# Patient Record
Sex: FEMALE | Race: WHITE | Employment: OTHER | URBAN - METROPOLITAN AREA
[De-identification: names, ages, dates, MRNs, and addresses within clinical notes are randomized per-mention and may not be internally consistent; named-entity substitution may affect disease eponyms.]

---

## 2018-10-28 ENCOUNTER — APPOINTMENT (OUTPATIENT)
Dept: GENERAL RADIOLOGY | Age: 83
DRG: 535 | End: 2018-10-28
Payer: MEDICARE

## 2018-10-28 ENCOUNTER — HOSPITAL ENCOUNTER (INPATIENT)
Age: 83
LOS: 2 days | Discharge: NURSING, REHAB OR LONG-TERM CARE | DRG: 535 | End: 2018-10-30
Attending: EMERGENCY MEDICINE | Admitting: INTERNAL MEDICINE
Payer: MEDICARE

## 2018-10-28 ENCOUNTER — APPOINTMENT (OUTPATIENT)
Dept: CT IMAGING | Age: 83
DRG: 535 | End: 2018-10-28
Payer: MEDICARE

## 2018-10-28 DIAGNOSIS — S72.001A CLOSED FRACTURE OF RIGHT HIP, INITIAL ENCOUNTER (HCC): Primary | ICD-10-CM

## 2018-10-28 PROBLEM — I10 ESSENTIAL HYPERTENSION: Chronic | Status: ACTIVE | Noted: 2018-10-28

## 2018-10-28 PROBLEM — Z86.79 H/O CHF: Status: ACTIVE | Noted: 2018-10-28

## 2018-10-28 PROBLEM — R09.02 HYPOXIA: Status: ACTIVE | Noted: 2018-10-28

## 2018-10-28 PROBLEM — M25.551 ACUTE HIP PAIN, RIGHT: Status: ACTIVE | Noted: 2018-10-28

## 2018-10-28 PROBLEM — N18.9 CKD (CHRONIC KIDNEY DISEASE): Chronic | Status: ACTIVE | Noted: 2018-10-28

## 2018-10-28 PROBLEM — E03.9 HYPOTHYROIDISM: Chronic | Status: ACTIVE | Noted: 2018-10-28

## 2018-10-28 PROBLEM — R32 URINARY INCONTINENCE: Chronic | Status: ACTIVE | Noted: 2018-10-28

## 2018-10-28 PROBLEM — Z86.79 H/O CARDIAC ARRHYTHMIA: Chronic | Status: ACTIVE | Noted: 2018-10-28

## 2018-10-28 PROBLEM — R06.02 SHORTNESS OF BREATH: Status: ACTIVE | Noted: 2018-10-28

## 2018-10-28 PROBLEM — K21.9 GASTROESOPHAGEAL REFLUX DISEASE WITHOUT ESOPHAGITIS: Chronic | Status: ACTIVE | Noted: 2018-10-28
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
## 2018-10-28 LAB
ANION GAP SERPL CALCULATED.3IONS-SCNC: 11 MMOL/L (ref 7–16)
APTT: 39.8 SEC (ref 24.5–35.1)
BACTERIA: ABNORMAL /HPF
BASOPHILS ABSOLUTE: 0.02 E9/L (ref 0–0.2)
BASOPHILS RELATIVE PERCENT: 0.2 % (ref 0–2)
BILIRUBIN URINE: NEGATIVE
BLOOD, URINE: ABNORMAL
BUN BLDV-MCNC: 23 MG/DL (ref 8–23)
CALCIUM SERPL-MCNC: 8.8 MG/DL (ref 8.6–10.2)
CHLORIDE BLD-SCNC: 102 MMOL/L (ref 98–107)
CLARITY: CLEAR
CO2: 28 MMOL/L (ref 22–29)
COLOR: ABNORMAL
CREAT SERPL-MCNC: 1.2 MG/DL (ref 0.5–1)
EOSINOPHILS ABSOLUTE: 0.04 E9/L (ref 0.05–0.5)
EOSINOPHILS RELATIVE PERCENT: 0.4 % (ref 0–6)
GFR AFRICAN AMERICAN: 50
GFR NON-AFRICAN AMERICAN: 42 ML/MIN/1.73
GLUCOSE BLD-MCNC: 107 MG/DL (ref 74–109)
GLUCOSE URINE: NEGATIVE MG/DL
HCT VFR BLD CALC: 38.8 % (ref 34–48)
HEMOGLOBIN: 12.5 G/DL (ref 11.5–15.5)
IMMATURE GRANULOCYTES #: 0.05 E9/L
IMMATURE GRANULOCYTES %: 0.5 % (ref 0–5)
INR BLD: 1.3
KETONES, URINE: NEGATIVE MG/DL
LEUKOCYTE ESTERASE, URINE: ABNORMAL
LYMPHOCYTES ABSOLUTE: 0.82 E9/L (ref 1.5–4)
LYMPHOCYTES RELATIVE PERCENT: 7.9 % (ref 20–42)
MCH RBC QN AUTO: 29.6 PG (ref 26–35)
MCHC RBC AUTO-ENTMCNC: 32.2 % (ref 32–34.5)
MCV RBC AUTO: 91.9 FL (ref 80–99.9)
MONOCYTES ABSOLUTE: 0.52 E9/L (ref 0.1–0.95)
MONOCYTES RELATIVE PERCENT: 5 % (ref 2–12)
NEUTROPHILS ABSOLUTE: 8.94 E9/L (ref 1.8–7.3)
NEUTROPHILS RELATIVE PERCENT: 86 % (ref 43–80)
NITRITE, URINE: NEGATIVE
PDW BLD-RTO: 14.3 FL (ref 11.5–15)
PH UA: 6 (ref 5–9)
PLATELET # BLD: 203 E9/L (ref 130–450)
PMV BLD AUTO: 10 FL (ref 7–12)
POTASSIUM SERPL-SCNC: 4 MMOL/L (ref 3.5–5)
PROTEIN UA: NEGATIVE MG/DL
PROTHROMBIN TIME: 15.1 SEC (ref 9.3–12.4)
RBC # BLD: 4.22 E12/L (ref 3.5–5.5)
RBC UA: ABNORMAL /HPF (ref 0–2)
SODIUM BLD-SCNC: 141 MMOL/L (ref 132–146)
SPECIFIC GRAVITY UA: <=1.005 (ref 1–1.03)
UROBILINOGEN, URINE: 0.2 E.U./DL
WBC # BLD: 10.4 E9/L (ref 4.5–11.5)
WBC UA: ABNORMAL /HPF (ref 0–5)

## 2018-10-28 PROCEDURE — 1200000000 HC SEMI PRIVATE

## 2018-10-28 PROCEDURE — 36415 COLL VENOUS BLD VENIPUNCTURE: CPT

## 2018-10-28 PROCEDURE — 51702 INSERT TEMP BLADDER CATH: CPT

## 2018-10-28 PROCEDURE — 73502 X-RAY EXAM HIP UNI 2-3 VIEWS: CPT

## 2018-10-28 PROCEDURE — 2700000000 HC OXYGEN THERAPY PER DAY

## 2018-10-28 PROCEDURE — 73700 CT LOWER EXTREMITY W/O DYE: CPT

## 2018-10-28 PROCEDURE — 70450 CT HEAD/BRAIN W/O DYE: CPT

## 2018-10-28 PROCEDURE — 2580000003 HC RX 258: Performed by: INTERNAL MEDICINE

## 2018-10-28 PROCEDURE — 80048 BASIC METABOLIC PNL TOTAL CA: CPT

## 2018-10-28 PROCEDURE — 99285 EMERGENCY DEPT VISIT HI MDM: CPT

## 2018-10-28 PROCEDURE — 6370000000 HC RX 637 (ALT 250 FOR IP): Performed by: INTERNAL MEDICINE

## 2018-10-28 PROCEDURE — 6370000000 HC RX 637 (ALT 250 FOR IP): Performed by: EMERGENCY MEDICINE

## 2018-10-28 PROCEDURE — 81001 URINALYSIS AUTO W/SCOPE: CPT

## 2018-10-28 PROCEDURE — 85610 PROTHROMBIN TIME: CPT

## 2018-10-28 PROCEDURE — 85730 THROMBOPLASTIN TIME PARTIAL: CPT

## 2018-10-28 PROCEDURE — 85025 COMPLETE CBC W/AUTO DIFF WBC: CPT

## 2018-10-28 PROCEDURE — 99223 1ST HOSP IP/OBS HIGH 75: CPT | Performed by: INTERNAL MEDICINE

## 2018-10-28 PROCEDURE — 73562 X-RAY EXAM OF KNEE 3: CPT

## 2018-10-28 PROCEDURE — 71045 X-RAY EXAM CHEST 1 VIEW: CPT

## 2018-10-28 RX ORDER — ONDANSETRON 2 MG/ML
4 INJECTION INTRAMUSCULAR; INTRAVENOUS EVERY 6 HOURS PRN
Status: DISCONTINUED | OUTPATIENT
Start: 2018-10-28 | End: 2018-10-30 | Stop reason: HOSPADM

## 2018-10-28 RX ORDER — LEVOTHYROXINE SODIUM 0.03 MG/1
25 TABLET ORAL DAILY
COMMUNITY

## 2018-10-28 RX ORDER — TRAMADOL HYDROCHLORIDE 50 MG/1
50 TABLET ORAL ONCE
Status: COMPLETED | OUTPATIENT
Start: 2018-10-28 | End: 2018-10-28

## 2018-10-28 RX ORDER — TRAMADOL HYDROCHLORIDE 50 MG/1
50 TABLET ORAL EVERY 6 HOURS PRN
Status: DISCONTINUED | OUTPATIENT
Start: 2018-10-28 | End: 2018-10-30 | Stop reason: HOSPADM

## 2018-10-28 RX ORDER — DOCUSATE SODIUM 100 MG/1
100 CAPSULE, LIQUID FILLED ORAL DAILY
COMMUNITY

## 2018-10-28 RX ORDER — TRAMADOL HYDROCHLORIDE 50 MG/1
50 TABLET ORAL EVERY 6 HOURS PRN
COMMUNITY

## 2018-10-28 RX ORDER — PANTOPRAZOLE SODIUM 40 MG/1
40 TABLET, DELAYED RELEASE ORAL
Status: DISCONTINUED | OUTPATIENT
Start: 2018-10-29 | End: 2018-10-30 | Stop reason: HOSPADM

## 2018-10-28 RX ORDER — AMLODIPINE BESYLATE 5 MG/1
5 TABLET ORAL DAILY
Status: DISCONTINUED | OUTPATIENT
Start: 2018-10-29 | End: 2018-10-30 | Stop reason: HOSPADM

## 2018-10-28 RX ORDER — METOPROLOL SUCCINATE 25 MG/1
25 TABLET, EXTENDED RELEASE ORAL 2 TIMES DAILY
COMMUNITY

## 2018-10-28 RX ORDER — DABIGATRAN ETEXILATE 75 MG/1
75 CAPSULE, COATED PELLETS ORAL 2 TIMES DAILY
COMMUNITY

## 2018-10-28 RX ORDER — LOSARTAN POTASSIUM 100 MG/1
100 TABLET ORAL DAILY
COMMUNITY

## 2018-10-28 RX ORDER — TROSPIUM CHLORIDE 20 MG/1
20 TABLET, FILM COATED ORAL NIGHTLY
Status: DISCONTINUED | OUTPATIENT
Start: 2018-10-28 | End: 2018-10-30 | Stop reason: HOSPADM

## 2018-10-28 RX ORDER — LEVOTHYROXINE SODIUM 0.03 MG/1
25 TABLET ORAL DAILY
Status: DISCONTINUED | OUTPATIENT
Start: 2018-10-29 | End: 2018-10-30 | Stop reason: HOSPADM

## 2018-10-28 RX ORDER — SODIUM CHLORIDE 0.9 % (FLUSH) 0.9 %
10 SYRINGE (ML) INJECTION PRN
Status: DISCONTINUED | OUTPATIENT
Start: 2018-10-28 | End: 2018-10-30 | Stop reason: HOSPADM

## 2018-10-28 RX ORDER — ACETAMINOPHEN 325 MG/1
650 TABLET ORAL EVERY 4 HOURS PRN
Status: DISCONTINUED | OUTPATIENT
Start: 2018-10-28 | End: 2018-10-30 | Stop reason: HOSPADM

## 2018-10-28 RX ORDER — DOCUSATE SODIUM 100 MG/1
100 CAPSULE, LIQUID FILLED ORAL DAILY
Status: DISCONTINUED | OUTPATIENT
Start: 2018-10-29 | End: 2018-10-30 | Stop reason: HOSPADM

## 2018-10-28 RX ORDER — LOSARTAN POTASSIUM 50 MG/1
100 TABLET ORAL DAILY
Status: DISCONTINUED | OUTPATIENT
Start: 2018-10-29 | End: 2018-10-30 | Stop reason: HOSPADM

## 2018-10-28 RX ORDER — FUROSEMIDE 20 MG/1
20 TABLET ORAL DAILY
Status: DISCONTINUED | OUTPATIENT
Start: 2018-10-29 | End: 2018-10-30 | Stop reason: HOSPADM

## 2018-10-28 RX ORDER — SODIUM CHLORIDE 0.9 % (FLUSH) 0.9 %
10 SYRINGE (ML) INJECTION EVERY 12 HOURS SCHEDULED
Status: DISCONTINUED | OUTPATIENT
Start: 2018-10-28 | End: 2018-10-30 | Stop reason: HOSPADM

## 2018-10-28 RX ORDER — CHOLECALCIFEROL (VITAMIN D3) 50 MCG
5000 TABLET ORAL DAILY
Status: DISCONTINUED | OUTPATIENT
Start: 2018-10-29 | End: 2018-10-30 | Stop reason: HOSPADM

## 2018-10-28 RX ORDER — FUROSEMIDE 20 MG/1
20 TABLET ORAL DAILY
COMMUNITY

## 2018-10-28 RX ORDER — OMEPRAZOLE 20 MG/1
40 CAPSULE, DELAYED RELEASE ORAL DAILY
COMMUNITY

## 2018-10-28 RX ORDER — MORPHINE SULFATE 2 MG/ML
1 INJECTION, SOLUTION INTRAMUSCULAR; INTRAVENOUS
Status: DISCONTINUED | OUTPATIENT
Start: 2018-10-28 | End: 2018-10-30 | Stop reason: HOSPADM

## 2018-10-28 RX ORDER — FLUOROMETHOLONE 0.1 %
SUSPENSION, DROPS(FINAL DOSAGE FORM)(ML) OPHTHALMIC (EYE) 2 TIMES DAILY
Status: DISCONTINUED | OUTPATIENT
Start: 2018-10-28 | End: 2018-10-30 | Stop reason: HOSPADM

## 2018-10-28 RX ORDER — TOLTERODINE 2 MG/1
2 CAPSULE, EXTENDED RELEASE ORAL DAILY
COMMUNITY

## 2018-10-28 RX ORDER — METOPROLOL SUCCINATE 25 MG/1
25 TABLET, EXTENDED RELEASE ORAL 2 TIMES DAILY
Status: DISCONTINUED | OUTPATIENT
Start: 2018-10-28 | End: 2018-10-30 | Stop reason: HOSPADM

## 2018-10-28 RX ORDER — AMLODIPINE BESYLATE 5 MG/1
5 TABLET ORAL DAILY
COMMUNITY

## 2018-10-28 RX ADMIN — FLUOROMETHOLONE: 1 SOLUTION/ DROPS OPHTHALMIC at 23:51

## 2018-10-28 RX ADMIN — Medication 10 ML: at 23:50

## 2018-10-28 RX ADMIN — TRAMADOL HYDROCHLORIDE 50 MG: 50 TABLET, FILM COATED ORAL at 14:12

## 2018-10-28 RX ADMIN — TROSPIUM CHLORIDE 20 MG: 20 TABLET ORAL at 23:50

## 2018-10-28 ASSESSMENT — ENCOUNTER SYMPTOMS
WHEEZING: 0
VOMITING: 0
ABDOMINAL DISTENTION: 0
SORE THROAT: 0
NAUSEA: 0
SINUS PRESSURE: 0
BACK PAIN: 0
EYE DISCHARGE: 0
EYE REDNESS: 0
SHORTNESS OF BREATH: 0
COUGH: 0
DIARRHEA: 0
EYE PAIN: 0

## 2018-10-28 ASSESSMENT — PAIN SCALES - GENERAL
PAINLEVEL_OUTOF10: 2
PAINLEVEL_OUTOF10: 7
PAINLEVEL_OUTOF10: 0

## 2018-10-28 ASSESSMENT — PAIN DESCRIPTION - LOCATION: LOCATION: LEG

## 2018-10-28 ASSESSMENT — PAIN DESCRIPTION - ORIENTATION: ORIENTATION: RIGHT

## 2018-10-28 ASSESSMENT — PAIN DESCRIPTION - PAIN TYPE: TYPE: ACUTE PAIN

## 2018-10-28 NOTE — H&P
[place] [address] [facility]     History and Physical      CHIEF COMPLAINT: Fall with injury to the right hip. History of Present Illness: The pt is a 80 y.o. female with a history of HTN, cardiac arrhythmia with significant pauses for which she underwent PPM placement about 6 years ago and is on [place] [place] [address], hypothyroidism and an episode of pneumonia in 12/2017 with possible associated CHF. Pt lives with family in [place] and they are currently on a brief visit to this area. Today AM, as pt was walking unassisted into a restaurant for breakfast, she tripped on an entry doorway threshold and fell rightwards onto the right hip. Pt immediately experienced a lot of pain and had difficulty getting up. Pt was helped up by her dtrs and had a lot of pain bearing weight on the RLE but was able to hobble to their car with support of family members. Pt was brought to the ER for evaluation in their personal vehicle. When she fell pt possibly bumped the back of her head against the wall but didn't have LOC. Pt's dtr recalls that pt was very short of breath while getting ready to go out today AM but didn't have any respiratory or cardiac sx before that or subsequently.     Informant(s) for H&P: Patient and her dtr Nette.    REVIEW OF SYSTEMS:  Constitutional: negative for chills, fevers, malaise, sweats and weight loss  Eyes: negative for visual disturbance and field loss  Ears, nose, mouth, throat, and face: negative for ear drainage, earaches, epistaxis, hoarseness, nasal congestion, sore throat, tinnitus and vertigo  Respiratory: positive for 1 episode of shortness of breath today AM while getting ready to go out for breakfast; intermittent mild non-productive cough, negative for dyspnea on exertion, hemoptysis, pleurisy/chest pain, sputum, stridor and wheezing  Cardiovascular: positive for occasional pedal edema at the end of the day that resolves on putting her feet up, negative for chest pain, exertional tablet by mouth daily  traMADol (ULTRAM) 50 MG tablet, Take 50 mg by mouth every 6 hours as needed for Pain. .  docusate sodium (COLACE) 100 MG capsule, Take 100 mg by mouth daily  levothyroxine (SYNTHROID) 25 MCG tablet, Take 25 mcg by mouth Daily  furosemide (LASIX) 20 MG tablet, Take 20 mg by mouth daily  fluorometholone (FML) 0.1 % ophthalmic ointment, Place into both eyes 2 times daily     Allergies:    Patient has no known allergies. Social History:    reports that she quit smoking about 65 years ago. She has a 5.00 pack-year smoking history. She has never used smokeless tobacco. She reports that she does not drink alcohol or use drugs. Cigarettes: 0.5 PPD x10 yrs, quit ~1953 by ~30 yrs age. EtOH: minimal, social consumption - none in a long time. . Living with one of her dtrs near [place], [place]. Used to work in [employer]. Family History:   Father  young of tuberculosis. Mother  of uncertain cause in old age. Sibs:  of unknown causes. Children: 4 dtrs - two have HTN and DM / pre-DM. PHYSICAL EXAM:    Vitals:  /61   Pulse 70   Temp 98.5 °F (36.9 °C) (Oral)   Resp 16   Ht 5' 4\" (1.626 m)   Wt 170 lb (77.1 kg)   SpO2 90%   BMI 29.18 kg/m²     At my exam, P 70/m, normal volume, regular; Resp unlabored. sO2 on RA 86-88% supine and 87-89% sitting up. sO2 improved to 92-94% on O2 NC 2 LPM.    General condition: Older female who looks younger than stated lying comfortably in bed in no acute distress. HEENT: Atraumatic. Pupils equal, round, reactive to light. No pallor, icterus. B/L IOL. No conjunctival injection. No nasal congestion or discharge. No pharyngeal erythema or pus points. Faucial aperture: Mallampati 4. Dentures noted. Neck:  Supple. Normal RoM. No JVD, hepato-jugular reflux, lymphadenopathy, thyroid enlargement, tracheal deviation, bruit. Chest: Symmetric.  Equal AE b/l with vesicular breath sounds and fine, distant bibasilar [place], UA Latest Ref Range: 1.005 - 1.030  <=1.005   Blood, Urine Latest Ref Range: Negative  TRACE-LYSED   pH, UA Latest Ref Range: 5.0 - 9.0  6.0   Protein, UA Latest Ref Range: Negative mg/dL Negative   Urobilinogen, Urine Latest Ref Range: <2.0 E.U./dL 0.2   Nitrite, Urine Latest Ref Range: Negative  Negative   Leukocyte Esterase, Urine Latest Ref Range: Negative  SMALL (A)   WBC, UA Latest Ref Range: 0 - 5 /HPF 1-3   RBC, UA Latest Ref Range: 0 - 2 /HPF 1-3   Bacteria, UA Latest Units: /HPF NONE       Radiology:   Xr Knee Right (3 Views)  Result Date: 10/28/2018  Patient MRN:  [id number] : 1922 Age: 89 years Gender: Female Order Date:  10/28/2018 2:45 PM EXAM: XR KNEE RIGHT (3 VIEWS) NUMBER OF IMAGES:  4 INDICATION:  fall fall COMPARISON: None TECHNIQUE: AP, oblique and lateral views of the right knee. FINDINGS: No fracture, dislocation or knee joint effusion seen. Bones are diffusely osteopenic. There are vascular calcifications. Joint spaces are maintained. Tricompartmental osteophytes noted. No acute osseous abnormality seen. Ct Head Wo Contrast  Result Date: 10/28/2018  Patient MRN:  [id number] : 1922 Age: 89 years Gender: Female Order Date:  10/28/2018 3:45 PM EXAM: CT HEAD WO CONTRAST NUMBER OF IMAGES:  148 INDICATION:  fall on blood thinners COMPARISON: RESULT: Post-operative change:  None. Acute change:   No evidence of an acute contusion or other acute parenchymal process. Hemorrhage:    No evidence of acute intracranial hemorrhage. Mass effect / Mass lesion:   There is no evidence of an intracranial mass or extraaxial fluid collection. No significant mass effect. 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 Extensive confluent foci of low attenuation are present in the supratentorial white matter which is nonspecific but likely represents extensive microvascular ischemia. Ventricles:   findings Paranasal sinuses and skull base:   The moderately raise the RLE straight. This suggests a fracture is less likely. · Ortho has been consulted from the ER. Will await Ortho eval and recommendations in AM. Keep pt NPO past MN for possible surgical procedure as a precaution. Also hold off Pradaxa OAC at this time for same reason. · Continue Tramadol 50 mg po q6h PRN pain as prior. Morphine 1 mg IV q3h PRN severe pain. · Continue meds for cardiac issues and HTN as prior. Resume Pradaxa [address] when ok with Ortho. F/u renal fx. Watch for development of acute CHF. · Pt had a brief episode of SOB in AM and is hypoxic now w/o persisting SOB and w/o any obvious etiology other than mildly abnormal chest exam. Get portable CXR now and consider further mgmt. Supplemental O2 via NC for now. Reassess sO2 in AM. Incentive spirometry w/a. · Hypothyroidism: Continue levothyroxine. · GERD: Continue PPI. · Urinary incontinence: Continue med, diaper. · DVT prophylaxis - Pt has been on Pradaxa [address]. SCDs overnight while Pradaxa is held pending Ortho eval.  · GI bleed prophylaxis - PPI as prior.       Pt was evaluated in the presence of her dtr Nette.  Please note that over 50 minutes was spent in evaluating the patient, review of records and results, discussion with staff / family (dtr Nette), etc.    Electronically signed by Swathi Brown  Night Hospitalist  [facility] Service at [place] [address]

## 2018-10-28 NOTE — ED PROVIDER NOTES
normal. No respiratory distress. She has no wheezes. She has no rales. Abdominal: Soft. Bowel sounds are normal. There is no tenderness. There is no rebound and no guarding. Musculoskeletal: She exhibits no edema. Right hip: She exhibits normal range of motion, no tenderness, no bony tenderness and no deformity. Right knee: She exhibits normal range of motion, no swelling and no effusion. No tenderness found. Cervical back: Normal. She exhibits no tenderness and no bony tenderness. Thoracic back: Normal. She exhibits no tenderness and no bony tenderness. Lumbar back: Normal. She exhibits no tenderness and no bony tenderness. Neurological: She is alert and oriented to person, place, and time. No cranial nerve deficit. Coordination normal.   Skin: Skin is warm and dry. Nursing note and vitals reviewed. Procedures    MDM    ED Course as of Oct 28 1837   [facility] Oct 28, 2018   1343 Having pain with moving leg, will given some pain medication and attempt to walk patient. [MF]   1426 Unable to bear weight to ambulate, does not normally require any assistance to ambulate  []   1716 Updated patient and family, called Dr. Anel Hawkins, she will call back. []   5164 Discussed case with Dr. Anel Hawkins, she will admit patient. [MF]   5816 Discussed case with Dr. Bertha Murguia, he will see patient. []      ED Course User Index  [MF] Antionettemitch Reina, DO       --------------------------------------------- PAST HISTORY ---------------------------------------------  Past Medical History:  has a past medical history of CHF (congestive heart failure) ([address] GERD (gastroesophageal reflux disease); Hypertension; Pacemaker; and Thyroid disease. Past Surgical History:  has no past surgical history on file. Social History:  reports that she has never smoked. She has never used smokeless tobacco. She reports that she does not drink alcohol or use drugs.     Family History: family history is not on file. The patients home medications have been reviewed. Allergies: Patient has no known allergies. -------------------------------------------------- RESULTS -------------------------------------------------    LABS:  Results for orders placed or performed during the hospital encounter of 10/28/18   CBC Auto Differential   Result Value Ref Range    WBC 10.4 4.5 - 11.5 E9/L    RBC 4.22 3.50 - 5.50 E12/L    Hemoglobin 12.5 11.5 - 15.5 g/dL    Hematocrit 38.8 34.0 - 48.0 %    MCV 91.9 80.0 - 99.9 fL    MCH 29.6 26.0 - 35.0 pg    MCHC 32.2 32.0 - 34.5 %    RDW 14.3 11.5 - 15.0 fL    Platelets 071 861 - 133 E9/L    MPV 10.0 7.0 - 12.0 fL    Neutrophils % 86.0 (H) 43.0 - 80.0 %    Immature Granulocytes % 0.5 0.0 - 5.0 %    Lymphocytes % 7.9 (L) 20.0 - 42.0 %    Monocytes % 5.0 2.0 - 12.0 %    Eosinophils % 0.4 0.0 - 6.0 %    Basophils % 0.2 0.0 - 2.0 %    Neutrophils # 8.94 (H) 1.80 - 7.30 E9/L    Immature Granulocytes # 0.05 E9/L    Lymphocytes # 0.82 (L) 1.50 - 4.00 E9/L    Monocytes # 0.52 0.10 - 0.95 E9/L    Eosinophils # 0.04 (L) 0.05 - 0.50 E9/L    Basophils # 0.02 0.00 - 0.20 E9/L       RADIOLOGY:  Xr Knee Right (3 Views)    Result Date: 10/28/2018  Patient MRN:  [id number] : 1922 Age: 89 years Gender: Female Order Date:  10/28/2018 2:45 PM EXAM: XR KNEE RIGHT (3 VIEWS) NUMBER OF IMAGES:  4 INDICATION:  fall fall COMPARISON: None TECHNIQUE: AP, oblique and lateral views of the right knee. FINDINGS: No fracture, dislocation or knee joint effusion seen. Bones are diffusely osteopenic. There are vascular calcifications. Joint spaces are maintained. Tricompartmental osteophytes noted. No acute osseous abnormality seen.      Ct Head Wo Contrast    Result Date: 10/28/2018  Patient MRN:  [id number] : 1922 Age: 89 years Gender: Female Order Date:  10/28/2018 3:45 PM EXAM: CT HEAD WO CONTRAST NUMBER OF IMAGES:  148 INDICATION:  fall on blood thinners COMPARISON: RESULT: Post-operative

## 2018-10-29 LAB
ANION GAP SERPL CALCULATED.3IONS-SCNC: 9 MMOL/L (ref 7–16)
BASOPHILS ABSOLUTE: 0.04 E9/L (ref 0–0.2)
BASOPHILS RELATIVE PERCENT: 0.5 % (ref 0–2)
BUN BLDV-MCNC: 26 MG/DL (ref 8–23)
CALCIUM SERPL-MCNC: 8.3 MG/DL (ref 8.6–10.2)
CHLORIDE BLD-SCNC: 101 MMOL/L (ref 98–107)
CO2: 26 MMOL/L (ref 22–29)
CREAT SERPL-MCNC: 1.3 MG/DL (ref 0.5–1)
EOSINOPHILS ABSOLUTE: 0.26 E9/L (ref 0.05–0.5)
EOSINOPHILS RELATIVE PERCENT: 3.4 % (ref 0–6)
GFR AFRICAN AMERICAN: 46
GFR NON-AFRICAN AMERICAN: 38 ML/MIN/1.73
GLUCOSE BLD-MCNC: 109 MG/DL (ref 74–109)
HCT VFR BLD CALC: 35 % (ref 34–48)
HEMOGLOBIN: 11.1 G/DL (ref 11.5–15.5)
IMMATURE GRANULOCYTES #: 0.03 E9/L
IMMATURE GRANULOCYTES %: 0.4 % (ref 0–5)
LYMPHOCYTES ABSOLUTE: 1.11 E9/L (ref 1.5–4)
LYMPHOCYTES RELATIVE PERCENT: 14.4 % (ref 20–42)
MAGNESIUM: 2.3 MG/DL (ref 1.6–2.6)
MCH RBC QN AUTO: 29.2 PG (ref 26–35)
MCHC RBC AUTO-ENTMCNC: 31.7 % (ref 32–34.5)
MCV RBC AUTO: 92.1 FL (ref 80–99.9)
MONOCYTES ABSOLUTE: 0.47 E9/L (ref 0.1–0.95)
MONOCYTES RELATIVE PERCENT: 6.1 % (ref 2–12)
NEUTROPHILS ABSOLUTE: 5.79 E9/L (ref 1.8–7.3)
NEUTROPHILS RELATIVE PERCENT: 75.2 % (ref 43–80)
PDW BLD-RTO: 14.3 FL (ref 11.5–15)
PLATELET # BLD: 185 E9/L (ref 130–450)
PMV BLD AUTO: 11.1 FL (ref 7–12)
POTASSIUM REFLEX MAGNESIUM: 4.7 MMOL/L (ref 3.5–5)
RBC # BLD: 3.8 E12/L (ref 3.5–5.5)
SODIUM BLD-SCNC: 136 MMOL/L (ref 132–146)
WBC # BLD: 7.7 E9/L (ref 4.5–11.5)

## 2018-10-29 PROCEDURE — 85025 COMPLETE CBC W/AUTO DIFF WBC: CPT

## 2018-10-29 PROCEDURE — 97162 PT EVAL MOD COMPLEX 30 MIN: CPT

## 2018-10-29 PROCEDURE — 97530 THERAPEUTIC ACTIVITIES: CPT

## 2018-10-29 PROCEDURE — G8978 MOBILITY CURRENT STATUS: HCPCS

## 2018-10-29 PROCEDURE — 36415 COLL VENOUS BLD VENIPUNCTURE: CPT

## 2018-10-29 PROCEDURE — 80048 BASIC METABOLIC PNL TOTAL CA: CPT

## 2018-10-29 PROCEDURE — 6370000000 HC RX 637 (ALT 250 FOR IP): Performed by: INTERNAL MEDICINE

## 2018-10-29 PROCEDURE — 2580000003 HC RX 258: Performed by: INTERNAL MEDICINE

## 2018-10-29 PROCEDURE — 1200000000 HC SEMI PRIVATE

## 2018-10-29 PROCEDURE — 2700000000 HC OXYGEN THERAPY PER DAY

## 2018-10-29 PROCEDURE — 99232 SBSQ HOSP IP/OBS MODERATE 35: CPT | Performed by: INTERNAL MEDICINE

## 2018-10-29 PROCEDURE — 83735 ASSAY OF MAGNESIUM: CPT

## 2018-10-29 PROCEDURE — G8979 MOBILITY GOAL STATUS: HCPCS

## 2018-10-29 RX ADMIN — Medication 10 ML: at 08:27

## 2018-10-29 RX ADMIN — FUROSEMIDE 20 MG: 20 TABLET ORAL at 08:26

## 2018-10-29 RX ADMIN — METOPROLOL SUCCINATE 25 MG: 25 TABLET, EXTENDED RELEASE ORAL at 08:26

## 2018-10-29 RX ADMIN — AMLODIPINE BESYLATE 5 MG: 5 TABLET ORAL at 08:26

## 2018-10-29 RX ADMIN — DOCUSATE SODIUM 100 MG: 100 CAPSULE, LIQUID FILLED ORAL at 08:26

## 2018-10-29 RX ADMIN — LEVOTHYROXINE SODIUM 25 MCG: 25 TABLET ORAL at 06:02

## 2018-10-29 RX ADMIN — PANTOPRAZOLE SODIUM 40 MG: 40 TABLET, DELAYED RELEASE ORAL at 06:02

## 2018-10-29 RX ADMIN — CHOLECALCIFEROL TAB 50 MCG (2000 UNIT) 5000 UNITS: 50 TAB at 08:30

## 2018-10-29 RX ADMIN — TROSPIUM CHLORIDE 20 MG: 20 TABLET ORAL at 20:53

## 2018-10-29 RX ADMIN — FLUOROMETHOLONE: 1 SOLUTION/ DROPS OPHTHALMIC at 20:53

## 2018-10-29 RX ADMIN — METOPROLOL SUCCINATE 25 MG: 25 TABLET, EXTENDED RELEASE ORAL at 20:53

## 2018-10-29 RX ADMIN — LOSARTAN POTASSIUM 100 MG: 50 TABLET, FILM COATED ORAL at 08:26

## 2018-10-29 RX ADMIN — Medication 10 ML: at 20:54

## 2018-10-29 RX ADMIN — TRAMADOL HYDROCHLORIDE 50 MG: 50 TABLET, FILM COATED ORAL at 18:23

## 2018-10-29 ASSESSMENT — PAIN DESCRIPTION - ORIENTATION: ORIENTATION: RIGHT

## 2018-10-29 ASSESSMENT — PAIN DESCRIPTION - DESCRIPTORS: DESCRIPTORS: ACHING;DISCOMFORT;DULL

## 2018-10-29 ASSESSMENT — PAIN SCALES - GENERAL
PAINLEVEL_OUTOF10: 0
PAINLEVEL_OUTOF10: 0
PAINLEVEL_OUTOF10: 6

## 2018-10-29 ASSESSMENT — PAIN DESCRIPTION - FREQUENCY: FREQUENCY: INTERMITTENT

## 2018-10-29 ASSESSMENT — PAIN DESCRIPTION - LOCATION: LOCATION: HIP

## 2018-10-29 ASSESSMENT — PAIN DESCRIPTION - PROGRESSION: CLINICAL_PROGRESSION: GRADUALLY WORSENING

## 2018-10-29 ASSESSMENT — PAIN DESCRIPTION - PAIN TYPE: TYPE: ACUTE PAIN

## 2018-10-29 ASSESSMENT — PAIN DESCRIPTION - ONSET: ONSET: ON-GOING

## 2018-10-29 NOTE — PROGRESS NOTES
[facility] Progress Note    Admitting Date and Time: 10/28/2018 11:21 AM  Admit Dx: Closed fracture of neck of right femur ([address] [S72.001A]  Closed fracture of neck of right femur ([address] [S72.001A]    Subjective:  Patient is being followed for Closed fracture of neck of right femur ([address] [S72.001A]  Closed fracture of neck of right femur ([address] [S72.001A]     Patient awake, alert, resting in bed in no acute distress  Currently reading a book  C/o ongoing intermittent soreness in right hip   On NC- denies sob currently  3 daughters at bedside- reporting that patient does get sob with exertion at baseline. She uses an [facility] at home. Per nursing patient is unable to have MRI due to pacer. Awaiting PT evaluation     ROS: denies fever, chills, cp, sob, n/v, HA unless stated above.       amLODIPine  5 mg Oral Daily    vitamin D  5,000 Units Oral Daily    docusate sodium  100 mg Oral Daily    fluorometholone   Both Eyes BID    furosemide  20 mg Oral Daily    levothyroxine  25 mcg Oral Daily    losartan  100 mg Oral Daily    metoprolol succinate  25 mg Oral BID    pantoprazole  40 mg Oral QAM AC    trospium  20 mg Oral Nightly    sodium chloride flush  10 mL Intravenous 2 times per day       traMADol 50 mg Q6H PRN   sodium chloride flush 10 mL PRN   magnesium hydroxide 30 mL Daily PRN   ondansetron 4 mg Q6H PRN   acetaminophen 650 mg Q4H PRN   morphine 1 mg Q3H PRN        Objective:    BP (!) 119/56   Pulse 66   Temp 98.6 °F (37 °C) (Oral)   Resp 16   Ht 5' 4\" (1.626 m)   Wt 170 lb (77.1 kg)   SpO2 95%   BMI 29.18 kg/m²   General Appearance: alert and oriented to person, place and time and in no acute distress- appears younger than stated age   Skin: warm and dry  Head: normocephalic and atraumatic  Neck: neck supple and non tender without mass   Pulmonary/Chest: clear to auscultation bilaterally  Cardiovascular: normal rate, normal S1 and S2 and no carotid bruits  Abdomen: soft,
any  difficulty. She has no pain with palpation of her medial or lateral  malleoli bilaterally. The anterior tibialis, posterior tibialis,  peroneal, and motor function are intact in both legs. She is able to  with a little difficulty straight leg raise her right lower extremity. She has no pain whatsoever with heel pound or log roll. When she  specifically points to where her pain is, it is over her buttocks. There is no evidence of bruising on her buttocks or greater  trochanteric area. She has no pain with palpation of her greater  trochanter. The patient is able to get out of bed independently and  stand with a walker and take a step and states she does not have any  pain in her groin. Radiographs, right hip, were unremarkable. CT scan, right hip, does  show a slight irregularity at the head, neck, and junction of the  right hip. There is a small cyst in the posterior acetabulum. There  is no discrete fracture seen. PLAN:  At this point, I will keep her n.p.o. and I would like to  obtain an MRI to rule out whether she has a hip fracture. Clinically,  she does not, but in this age range elderly patients have much less  pain than younger patients and because she lives out of the area, I am  not able to readily follow up and manage this patient. I want to  ensure that she will be safe to return to her home in [place], which  is a long car ride with multiple stops for rest areas, but I will  commend further after the MRI was obtained. If it is negative, I will  have her work with Physical Therapy and allow her to eat and then she  can be discharged at her convenience.         Anna Gross MD    D:10/29/2018 10:46:49               T: 10/29/2018 12:32:42      [email or web address]  Job#: [id number]     Doc#: [id number]    CC:

## 2018-10-29 NOTE — CONSULTS
QAM AC  trospium (SANCTURA) tablet 20 mg, 20 mg, Oral, Nightly  traMADol (ULTRAM) tablet 50 mg, 50 mg, Oral, Q6H PRN  sodium chloride flush 0.9 % injection 10 mL, 10 mL, Intravenous, 2 times per day  sodium chloride flush 0.9 % injection 10 mL, 10 mL, Intravenous, PRN  magnesium hydroxide (MILK OF MAGNESIA) 400 MG/5ML suspension 30 mL, 30 mL, Oral, Daily PRN  ondansetron (ZOFRAN) injection 4 mg, 4 mg, Intravenous, Q6H PRN  acetaminophen (TYLENOL) tablet 650 mg, 650 mg, Oral, Q4H PRN  morphine (PF) injection 1 mg, 1 mg, Intravenous, Q3H PRN  Allergies:  Patient has no known allergies. Social History:   Patient denies any use of tobacco alcohol  Family History:   History reviewed. No pertinent family history. REVIEW OF SYSTEMS:    Unremarkable from the H&P. With no significant changes. PHYSICAL EXAM:    VITALS:  BP (!) 126/54   Pulse 64   Temp 98.2 °F (36.8 °C) (Oral)   Resp 16   Ht 5' 4\" (1.626 m)   Wt 170 lb (77.1 kg)   SpO2 95%   BMI 29.18 kg/m²     Right hip examination: Calf and thigh are soft. Distal pulses present. Negative leg length discrepancy or shortening. Hip range of motion flexion 100°, internal rotation 15°, external rotation 25°. Abduction 40°. Minimal discomfort with hip range of motion passively. Negative straight leg raise. Knee flexion extension without difficulty. Negative Homans.     DATA:    CBC:   Lab Results   Component Value Date    WBC 7.7 10/29/2018    RBC 3.80 10/29/2018    HGB 11.1 10/29/2018    HCT 35.0 10/29/2018    MCV 92.1 10/29/2018    MCH 29.2 10/29/2018    MCHC 31.7 10/29/2018    RDW 14.3 10/29/2018     10/29/2018    MPV 11.1 10/29/2018     CBC with Differential:    Lab Results   Component Value Date    WBC 7.7 10/29/2018    RBC 3.80 10/29/2018    HGB 11.1 10/29/2018    HCT 35.0 10/29/2018     10/29/2018    MCV 92.1 10/29/2018    MCH 29.2 10/29/2018    MCHC 31.7 10/29/2018    RDW 14.3 10/29/2018    LYMPHOPCT 14.4 10/29/2018    MONOPCT 6.1 10/29/2018

## 2018-10-29 NOTE — CARE COORDINATION
Social Work:    Social service met with Mrs. Nabil Baez and her three daughter's Melisa Machado. Mrs. Nabil Baez and her daughter's reside in [place] and were here visiting the area as Mrs. Nabil Baez use to live here. Luis Alberto Weldon explained that they were staying in a hotel a couple nights and plan to return home, should the MRI come back normal.  Mrs. Nabil Baez resides with Luis Alberto Weldon and was independent with ADL;'s.  Luis Alberto Weldon explained that her cousin can supply them with a wheelchair and they have walker & canes at home. No other needs were identified presently. Will follow.     Electronically signed by CEM Arreola on 10/29/2018 at 4:07 PM

## 2018-10-30 VITALS
RESPIRATION RATE: 14 BRPM | SYSTOLIC BLOOD PRESSURE: 123 MMHG | TEMPERATURE: 98 F | OXYGEN SATURATION: 92 % | DIASTOLIC BLOOD PRESSURE: 59 MMHG | HEART RATE: 62 BPM | BODY MASS INDEX: 29.71 KG/M2 | HEIGHT: 64 IN | WEIGHT: 174 LBS

## 2018-10-30 LAB
ANION GAP SERPL CALCULATED.3IONS-SCNC: 10 MMOL/L (ref 7–16)
BASOPHILS ABSOLUTE: 0.03 E9/L (ref 0–0.2)
BASOPHILS RELATIVE PERCENT: 0.4 % (ref 0–2)
BUN BLDV-MCNC: 26 MG/DL (ref 8–23)
CALCIUM SERPL-MCNC: 8.5 MG/DL (ref 8.6–10.2)
CHLORIDE BLD-SCNC: 98 MMOL/L (ref 98–107)
CO2: 27 MMOL/L (ref 22–29)
CREAT SERPL-MCNC: 1.3 MG/DL (ref 0.5–1)
EOSINOPHILS ABSOLUTE: 0.4 E9/L (ref 0.05–0.5)
EOSINOPHILS RELATIVE PERCENT: 5.5 % (ref 0–6)
GFR AFRICAN AMERICAN: 46
GFR NON-AFRICAN AMERICAN: 38 ML/MIN/1.73
GLUCOSE BLD-MCNC: 101 MG/DL (ref 74–109)
HCT VFR BLD CALC: 33 % (ref 34–48)
HEMOGLOBIN: 10.6 G/DL (ref 11.5–15.5)
IMMATURE GRANULOCYTES #: 0.03 E9/L
IMMATURE GRANULOCYTES %: 0.4 % (ref 0–5)
LYMPHOCYTES ABSOLUTE: 1.36 E9/L (ref 1.5–4)
LYMPHOCYTES RELATIVE PERCENT: 18.7 % (ref 20–42)
MCH RBC QN AUTO: 29.5 PG (ref 26–35)
MCHC RBC AUTO-ENTMCNC: 32.1 % (ref 32–34.5)
MCV RBC AUTO: 91.9 FL (ref 80–99.9)
MONOCYTES ABSOLUTE: 0.56 E9/L (ref 0.1–0.95)
MONOCYTES RELATIVE PERCENT: 7.7 % (ref 2–12)
NEUTROPHILS ABSOLUTE: 4.9 E9/L (ref 1.8–7.3)
NEUTROPHILS RELATIVE PERCENT: 67.3 % (ref 43–80)
PDW BLD-RTO: 14.3 FL (ref 11.5–15)
PLATELET # BLD: 170 E9/L (ref 130–450)
PMV BLD AUTO: 11.1 FL (ref 7–12)
POTASSIUM REFLEX MAGNESIUM: 4 MMOL/L (ref 3.5–5)
RBC # BLD: 3.59 E12/L (ref 3.5–5.5)
SODIUM BLD-SCNC: 135 MMOL/L (ref 132–146)
WBC # BLD: 7.3 E9/L (ref 4.5–11.5)

## 2018-10-30 PROCEDURE — 80048 BASIC METABOLIC PNL TOTAL CA: CPT

## 2018-10-30 PROCEDURE — 97530 THERAPEUTIC ACTIVITIES: CPT

## 2018-10-30 PROCEDURE — 2700000000 HC OXYGEN THERAPY PER DAY

## 2018-10-30 PROCEDURE — 36415 COLL VENOUS BLD VENIPUNCTURE: CPT

## 2018-10-30 PROCEDURE — 2580000003 HC RX 258: Performed by: INTERNAL MEDICINE

## 2018-10-30 PROCEDURE — 99239 HOSP IP/OBS DSCHRG MGMT >30: CPT | Performed by: INTERNAL MEDICINE

## 2018-10-30 PROCEDURE — 6370000000 HC RX 637 (ALT 250 FOR IP): Performed by: INTERNAL MEDICINE

## 2018-10-30 PROCEDURE — 85025 COMPLETE CBC W/AUTO DIFF WBC: CPT

## 2018-10-30 RX ADMIN — FLUOROMETHOLONE: 1 SOLUTION/ DROPS OPHTHALMIC at 08:06

## 2018-10-30 RX ADMIN — DOCUSATE SODIUM 100 MG: 100 CAPSULE, LIQUID FILLED ORAL at 08:06

## 2018-10-30 RX ADMIN — FUROSEMIDE 20 MG: 20 TABLET ORAL at 08:06

## 2018-10-30 RX ADMIN — Medication 10 ML: at 08:06

## 2018-10-30 RX ADMIN — AMLODIPINE BESYLATE 5 MG: 5 TABLET ORAL at 08:06

## 2018-10-30 RX ADMIN — PANTOPRAZOLE SODIUM 40 MG: 40 TABLET, DELAYED RELEASE ORAL at 06:37

## 2018-10-30 RX ADMIN — LEVOTHYROXINE SODIUM 25 MCG: 25 TABLET ORAL at 06:37

## 2018-10-30 RX ADMIN — CHOLECALCIFEROL TAB 50 MCG (2000 UNIT) 5000 UNITS: 50 TAB at 08:06

## 2018-10-30 RX ADMIN — METOPROLOL SUCCINATE 25 MG: 25 TABLET, EXTENDED RELEASE ORAL at 08:06

## 2018-10-30 RX ADMIN — LOSARTAN POTASSIUM 100 MG: 50 TABLET, FILM COATED ORAL at 08:06

## 2018-10-30 ASSESSMENT — PAIN SCALES - GENERAL: PAINLEVEL_OUTOF10: 0

## 2018-10-30 NOTE — CARE COORDINATION
Social Work:    Patient and family plan return home to [place] and have arranged rehab at [facility] with patient's PCP in [place].       Electronically signed by CEM Pedroza on 10/30/2018 at 10:49 AM

## 2018-10-30 NOTE — DISCHARGE INSTR - COC
Equipment (for information only, NOT a DME order):  {EQUIPMENT:280148771}  Other Treatments: ***    Patient's personal belongings (please select all that are sent with patient):  {CHP DME Belongings:874126950}    RN SIGNATURE:  {Esignature:[id number]}    CASE MANAGEMENT/SOCIAL WORK SECTION    Inpatient Status Date: ***    Readmission Risk Assessment Score:  Readmission Risk              Risk of Unplanned Readmission:        13           Discharging to Facility/ Agency   · Name: [facility]  · [address] [place] [address]  · Phone:[phone]  · [address] [facility]    Dialysis Facility (if applicable)   · Name:  · Address:  · Dialysis Schedule:  · Phone:  · Fax:    / signature: Electronically signed by CEM Horton on 10/30/2018 at 10:48 AM    PHYSICIAN SECTION    Prognosis: {Prognosis:0618838790}    Condition at Discharge: [facility] Patient Condition:[id number]}    Rehab Potential (if transferring to Rehab): Good    Recommended Labs or Other Treatments After Discharge: ***    Physician Certification: I certify the above information and transfer of Martha Cheng  is necessary for the continuing treatment of the diagnosis listed and that she requires [facility] for less 30 days.      Update Admission H&P: {CHP DME Changes in [facility]:249532639}    PHYSICIAN SIGNATURE:  {Esignature:[id number]}

## 2018-10-30 NOTE — DISCHARGE SUMMARY
area. Today AM, as pt was walking unassisted into a restaurant for breakfast, she tripped on an entry doorway threshold and fell rightwards onto the right hip. Pt immediately experienced a lot of pain and had difficulty getting up. Pt was helped up by her dtrs and had a lot of pain bearing weight on the RLE but was able to hobble to their car with support of family members. Pt was brought to the ER for evaluation in their personal vehicle. When she fell pt possibly bumped the back of her head against the wall but didn't have LOC. Pt's dtr recalls that pt was very short of breath while getting ready to go out today AM but didn't have any respiratory or cardiac sx before that or subsequently. Pt seen and examined by ortho. Clinically, pain not due to fracture. Pt noted to have sob and low oxygen level requiring oxygen which improved with incentive spirometry. Discussed labs with pt. Pt wanting to return to home for any further workup by her doctors. On day of discharge, pain controlled and pt denied fevers, chills,n/v, and sob. Discharge planning d/w pt. Time given for questions and all questions answered. Pt to go to snf in [place] for rehab. Discharge Exam:  Vitals:    10/29/18 2054 10/30/18 0030 10/30/18 0600 10/30/18 0759   BP:    (!) 123/59   Pulse:    62   Resp:    14   Temp:  99.1 °F (37.3 °C)  98 °F (36.7 °C)   TempSrc:  Oral  Oral   SpO2: 94%   92%   Weight:   174 lb (78.9 kg)    Height:           Skin: warm and dry, no rash or erythema  Pulmonary/Chest: clear to auscultation bilaterally- no wheezes, rales or rhonchi, normal air movement, no respiratory distress  Cardiovascular: rhythm reg at rate of 64  Abdomen: soft, non-tender, non-distended, normal bowel sounds, no masses or organomegaly  Extremities: no cyanosis, no clubbing and no edema  I/O last 3 completed shifts: In: 240 [P.O.:240]  Out: 1150 [Urine:1150]  No intake/output data recorded.       LABS:  Recent Labs      10/28/18   9965
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
